# Patient Record
Sex: MALE | Race: BLACK OR AFRICAN AMERICAN | NOT HISPANIC OR LATINO | URBAN - METROPOLITAN AREA
[De-identification: names, ages, dates, MRNs, and addresses within clinical notes are randomized per-mention and may not be internally consistent; named-entity substitution may affect disease eponyms.]

---

## 2023-12-11 ENCOUNTER — HOSPITAL ENCOUNTER (EMERGENCY)
Facility: HOSPITAL | Age: 68
Discharge: HOME | End: 2023-12-12
Attending: EMERGENCY MEDICINE
Payer: MEDICARE

## 2023-12-11 DIAGNOSIS — T50.904A DRUG OVERDOSE OF UNDETERMINED INTENT, INITIAL ENCOUNTER: Primary | ICD-10-CM

## 2023-12-11 PROCEDURE — 99283 EMERGENCY DEPT VISIT LOW MDM: CPT | Mod: 25 | Performed by: EMERGENCY MEDICINE

## 2023-12-11 PROCEDURE — 93010 ELECTROCARDIOGRAM REPORT: CPT | Performed by: EMERGENCY MEDICINE

## 2023-12-11 PROCEDURE — 99284 EMERGENCY DEPT VISIT MOD MDM: CPT | Performed by: EMERGENCY MEDICINE

## 2023-12-11 RX ORDER — TOPIRAMATE 100 MG/1
100 TABLET, FILM COATED ORAL DAILY
Status: DISCONTINUED | OUTPATIENT
Start: 2023-12-12 | End: 2023-12-12 | Stop reason: HOSPADM

## 2023-12-11 ASSESSMENT — COLUMBIA-SUICIDE SEVERITY RATING SCALE - C-SSRS
6. HAVE YOU EVER DONE ANYTHING, STARTED TO DO ANYTHING, OR PREPARED TO DO ANYTHING TO END YOUR LIFE?: NO
2. HAVE YOU ACTUALLY HAD ANY THOUGHTS OF KILLING YOURSELF?: NO
1. IN THE PAST MONTH, HAVE YOU WISHED YOU WERE DEAD OR WISHED YOU COULD GO TO SLEEP AND NOT WAKE UP?: NO

## 2023-12-11 ASSESSMENT — PAIN - FUNCTIONAL ASSESSMENT: PAIN_FUNCTIONAL_ASSESSMENT: 0-10

## 2023-12-11 ASSESSMENT — PAIN SCALES - GENERAL: PAINLEVEL_OUTOF10: 0 - NO PAIN

## 2023-12-12 ENCOUNTER — ANCILLARY PROCEDURE (OUTPATIENT)
Dept: EMERGENCY MEDICINE | Facility: HOSPITAL | Age: 68
End: 2023-12-12
Payer: MEDICARE

## 2023-12-12 VITALS
RESPIRATION RATE: 16 BRPM | HEART RATE: 62 BPM | BODY MASS INDEX: 21.47 KG/M2 | HEIGHT: 70 IN | WEIGHT: 150 LBS | OXYGEN SATURATION: 99 % | SYSTOLIC BLOOD PRESSURE: 122 MMHG | DIASTOLIC BLOOD PRESSURE: 70 MMHG | TEMPERATURE: 97.6 F

## 2023-12-12 VITALS
WEIGHT: 150 LBS | SYSTOLIC BLOOD PRESSURE: 117 MMHG | DIASTOLIC BLOOD PRESSURE: 71 MMHG | TEMPERATURE: 97.6 F | HEART RATE: 54 BPM | OXYGEN SATURATION: 96 % | RESPIRATION RATE: 16 BRPM | BODY MASS INDEX: 21.47 KG/M2 | HEIGHT: 70 IN

## 2023-12-12 DIAGNOSIS — T40.2X1A OPIOID OVERDOSE, ACCIDENTAL OR UNINTENTIONAL, INITIAL ENCOUNTER (MULTI): Primary | ICD-10-CM

## 2023-12-12 LAB
ANION GAP BLDV CALCULATED.4IONS-SCNC: 10 MMOL/L (ref 10–25)
ANION GAP BLDV CALCULATED.4IONS-SCNC: 12 MMOL/L (ref 10–25)
ANION GAP BLDV CALCULATED.4IONS-SCNC: 9 MMOL/L (ref 10–25)
ATRIAL RATE: 90 BPM
BASE EXCESS BLDV CALC-SCNC: -1.2 MMOL/L (ref -2–3)
BASE EXCESS BLDV CALC-SCNC: -3.2 MMOL/L (ref -2–3)
BASE EXCESS BLDV CALC-SCNC: 1.2 MMOL/L (ref -2–3)
BODY TEMPERATURE: 37 DEGREES CELSIUS
CA-I BLDV-SCNC: 1.09 MMOL/L (ref 1.1–1.33)
CA-I BLDV-SCNC: 1.15 MMOL/L (ref 1.1–1.33)
CA-I BLDV-SCNC: 1.18 MMOL/L (ref 1.1–1.33)
CHLORIDE BLDV-SCNC: 105 MMOL/L (ref 98–107)
CHLORIDE BLDV-SCNC: 105 MMOL/L (ref 98–107)
CHLORIDE BLDV-SCNC: 108 MMOL/L (ref 98–107)
GLUCOSE BLD MANUAL STRIP-MCNC: 107 MG/DL (ref 74–99)
GLUCOSE BLD MANUAL STRIP-MCNC: 68 MG/DL (ref 74–99)
GLUCOSE BLD MANUAL STRIP-MCNC: 69 MG/DL (ref 74–99)
GLUCOSE BLD MANUAL STRIP-MCNC: 75 MG/DL (ref 74–99)
GLUCOSE BLDV-MCNC: 110 MG/DL (ref 74–99)
GLUCOSE BLDV-MCNC: 60 MG/DL (ref 74–99)
GLUCOSE BLDV-MCNC: 72 MG/DL (ref 74–99)
HCO3 BLDV-SCNC: 26 MMOL/L (ref 22–26)
HCO3 BLDV-SCNC: 26.4 MMOL/L (ref 22–26)
HCO3 BLDV-SCNC: 29.1 MMOL/L (ref 22–26)
HCT VFR BLD EST: 33 % (ref 41–52)
HCT VFR BLD EST: 34 % (ref 41–52)
HCT VFR BLD EST: 39 % (ref 41–52)
HGB BLDV-MCNC: 10.9 G/DL (ref 13.5–17.5)
HGB BLDV-MCNC: 11.2 G/DL (ref 13.5–17.5)
HGB BLDV-MCNC: 13.1 G/DL (ref 13.5–17.5)
INHALED O2 CONCENTRATION: 21 %
INHALED O2 CONCENTRATION: 21 %
LACTATE BLDV-SCNC: 0.7 MMOL/L (ref 0.4–2)
LACTATE BLDV-SCNC: 1 MMOL/L (ref 0.4–2)
LACTATE BLDV-SCNC: 1.9 MMOL/L (ref 0.4–2)
OXYHGB MFR BLDV: 34.6 % (ref 45–75)
OXYHGB MFR BLDV: 53.8 % (ref 45–75)
OXYHGB MFR BLDV: 81.4 % (ref 45–75)
P AXIS: 82 DEGREES
P OFFSET: 158 MS
P ONSET: 102 MS
PCO2 BLDV: 54 MM HG (ref 41–51)
PCO2 BLDV: 62 MM HG (ref 41–51)
PCO2 BLDV: 69 MM HG (ref 41–51)
PH BLDV: 7.19 PH (ref 7.33–7.43)
PH BLDV: 7.28 PH (ref 7.33–7.43)
PH BLDV: 7.29 PH (ref 7.33–7.43)
PO2 BLDV: 30 MM HG (ref 35–45)
PO2 BLDV: 38 MM HG (ref 35–45)
PO2 BLDV: 61 MM HG (ref 35–45)
POTASSIUM BLDV-SCNC: 4.3 MMOL/L (ref 3.5–5.3)
POTASSIUM BLDV-SCNC: 4.6 MMOL/L (ref 3.5–5.3)
POTASSIUM BLDV-SCNC: 5.1 MMOL/L (ref 3.5–5.3)
PR INTERVAL: 238 MS
Q ONSET: 221 MS
QRS COUNT: 15 BEATS
QRS DURATION: 68 MS
QT INTERVAL: 366 MS
QTC CALCULATION(BAZETT): 447 MS
QTC FREDERICIA: 419 MS
R AXIS: 68 DEGREES
SAO2 % BLDV: 36 % (ref 45–75)
SAO2 % BLDV: 56 % (ref 45–75)
SAO2 % BLDV: 85 % (ref 45–75)
SODIUM BLDV-SCNC: 138 MMOL/L (ref 136–145)
SODIUM BLDV-SCNC: 139 MMOL/L (ref 136–145)
SODIUM BLDV-SCNC: 140 MMOL/L (ref 136–145)
T AXIS: 74 DEGREES
T OFFSET: 404 MS
VENTRICULAR RATE: 90 BPM

## 2023-12-12 PROCEDURE — 2500000004 HC RX 250 GENERAL PHARMACY W/ HCPCS (ALT 636 FOR OP/ED): Mod: SE

## 2023-12-12 PROCEDURE — 96360 HYDRATION IV INFUSION INIT: CPT

## 2023-12-12 PROCEDURE — 94681 O2 UPTK CO2 OUTP % O2 XTRC: CPT

## 2023-12-12 PROCEDURE — 82947 ASSAY GLUCOSE BLOOD QUANT: CPT

## 2023-12-12 PROCEDURE — 93005 ELECTROCARDIOGRAM TRACING: CPT

## 2023-12-12 PROCEDURE — 83605 ASSAY OF LACTIC ACID: CPT | Performed by: STUDENT IN AN ORGANIZED HEALTH CARE EDUCATION/TRAINING PROGRAM

## 2023-12-12 PROCEDURE — 2500000004 HC RX 250 GENERAL PHARMACY W/ HCPCS (ALT 636 FOR OP/ED): Mod: SE | Performed by: EMERGENCY MEDICINE

## 2023-12-12 PROCEDURE — 99291 CRITICAL CARE FIRST HOUR: CPT | Mod: 25 | Performed by: EMERGENCY MEDICINE

## 2023-12-12 PROCEDURE — 36415 COLL VENOUS BLD VENIPUNCTURE: CPT | Performed by: STUDENT IN AN ORGANIZED HEALTH CARE EDUCATION/TRAINING PROGRAM

## 2023-12-12 PROCEDURE — 82330 ASSAY OF CALCIUM: CPT | Performed by: STUDENT IN AN ORGANIZED HEALTH CARE EDUCATION/TRAINING PROGRAM

## 2023-12-12 PROCEDURE — 84132 ASSAY OF SERUM POTASSIUM: CPT

## 2023-12-12 RX ORDER — NALOXONE HYDROCHLORIDE 4 MG/.1ML
4 SPRAY NASAL AS NEEDED
Qty: 1 EACH | Refills: 0 | Status: SHIPPED | OUTPATIENT
Start: 2023-12-12 | End: 2023-12-12 | Stop reason: WASHOUT

## 2023-12-12 RX ORDER — NALOXONE HYDROCHLORIDE 4 MG/.1ML
SPRAY NASAL
Status: DISPENSED
Start: 2023-12-12 | End: 2023-12-12

## 2023-12-12 RX ORDER — NALOXONE HYDROCHLORIDE 4 MG/.1ML
4 SPRAY NASAL AS NEEDED
Qty: 1 EACH | Refills: 0
Start: 2023-12-12 | End: 2023-12-12 | Stop reason: WASHOUT

## 2023-12-12 RX ORDER — NALOXONE HYDROCHLORIDE 4 MG/.1ML
4 SPRAY NASAL AS NEEDED
Qty: 1 EACH | Refills: 0 | Status: ACTIVE
Start: 2023-12-12 | End: 2023-12-13

## 2023-12-12 RX ORDER — NALOXONE HYDROCHLORIDE 1 MG/ML
INJECTION INTRAMUSCULAR; INTRAVENOUS; SUBCUTANEOUS
Status: COMPLETED
Start: 2023-12-12 | End: 2023-12-12

## 2023-12-12 RX ORDER — NALOXONE HYDROCHLORIDE 4 MG/.1ML
4 SPRAY NASAL AS NEEDED
Qty: 1 EACH | Refills: 0 | COMMUNITY
Start: 2023-12-12 | End: 2024-12-11

## 2023-12-12 RX ADMIN — NALOXONE HYDROCHLORIDE 2 MG: 1 INJECTION, SOLUTION INTRAMUSCULAR; INTRAVENOUS; SUBCUTANEOUS at 09:17

## 2023-12-12 RX ADMIN — SODIUM CHLORIDE, POTASSIUM CHLORIDE, SODIUM LACTATE AND CALCIUM CHLORIDE 1000 ML: 600; 310; 30; 20 INJECTION, SOLUTION INTRAVENOUS at 13:41

## 2023-12-12 ASSESSMENT — COLUMBIA-SUICIDE SEVERITY RATING SCALE - C-SSRS
2. HAVE YOU ACTUALLY HAD ANY THOUGHTS OF KILLING YOURSELF?: NO
1. IN THE PAST MONTH, HAVE YOU WISHED YOU WERE DEAD OR WISHED YOU COULD GO TO SLEEP AND NOT WAKE UP?: NO
6. HAVE YOU EVER DONE ANYTHING, STARTED TO DO ANYTHING, OR PREPARED TO DO ANYTHING TO END YOUR LIFE?: NO

## 2023-12-12 ASSESSMENT — LIFESTYLE VARIABLES
EVER FELT BAD OR GUILTY ABOUT YOUR DRINKING: NO
HAVE YOU EVER FELT YOU SHOULD CUT DOWN ON YOUR DRINKING: NO
HAVE PEOPLE ANNOYED YOU BY CRITICIZING YOUR DRINKING: NO
REASON UNABLE TO ASSESS: NO
EVER HAD A DRINK FIRST THING IN THE MORNING TO STEADY YOUR NERVES TO GET RID OF A HANGOVER: NO

## 2023-12-12 ASSESSMENT — PAIN - FUNCTIONAL ASSESSMENT: PAIN_FUNCTIONAL_ASSESSMENT: 0-10

## 2023-12-12 ASSESSMENT — PAIN SCALES - GENERAL: PAINLEVEL_OUTOF10: 0 - NO PAIN

## 2023-12-12 NOTE — DISCHARGE INSTRUCTIONS
Please stop using opioid medications. Please  Narcan and use as needed. Come back if you decide you would like help with substance use disorder.

## 2023-12-12 NOTE — ED PROVIDER NOTES
History of Present Illness   CC: Drug Overdose     History provided by: Patient  Limitations to History: None    HPI:  Rodo Guzman is a 68 y.o. male with history of epilepsy, not compliant with topiramate, opioid use disorder presenting to the emergency department after overdose.  Patient reports snorting heroin this evening, snorts it every day or every other day.  States there also been something laced in it because he is not sure what happened after he snorted it.  EMS reportedly provided at 8 mg of Narcan due to patient unresponsiveness, patient awake, alert at time of my assessment.  No complaints at this time.  Denies any chest pain, shortness of breath, abdominal pain, nausea, vomiting.  Does want to seek treatment for opioid use disorder and stop using.  Denies any additional substance use.  Denies any history of opioid withdrawal.    External Records Reviewed: Patient has previous presentations for similar complaints to [facility] on 9/27/2023    Physical Exam   Triage vitals:  T 36.4 °C (97.6 °F)  HR 81  BP (!) 145/98  RR 18  O2 97 % None (Room air)    Vital signs reviewed in nursing triage note, EMR flow sheets, and at patient's bedside.   General: Awake, alert, in no acute distress  Eyes: Gaze conjugate.  No scleral icterus or injection  HENT: Normo-cephalic, atraumatic. No stridor.  CV: Regular rate, Regular rhythm. Radial pulses 2+ bilaterally  Resp: Breathing non-labored, speaking in full sentences.  Clear to auscultation bilaterally.  No wheezes, rales, rhonchi  GI: Soft, non-distended, non-tender. No rebound or guarding.  MSK/Extremities: No gross bony deformities. Moving all extremities  Skin: Warm. Appropriate color  Neuro: Alert. Oriented. Face symmetric. Speech is fluent.  Gross strength and sensation intact in b/l UE and LEs  Psych: Appropriate mood and affect    ED Course & Medical Decision Making   ED Course:  ED Course as of 12/12/23 1201   Mon Dec 11, 2023   3779 ECG 12 lead  EKG  obtained at 2241 demonstrating sinus rhythm with rate of 90, first-degree AV block, normal axis, otherwise normal intervals, no ST segment or T wave signs of ischemia. [SH]      ED Course User Index  [SH] Duncan Bhatt MD         Diagnoses as of 12/12/23 1201   Drug overdose of undetermined intent, initial encounter       Differential diagnoses considered include but are not limited to: Opioid overdose, intoxication by other substance, opioid withdrawal, seizure, syncope    Social Determinants Limiting Care: Substance Use    MDM:  68 y.o. male presenting to the emergency department after apparent opioid overdose, treated with Narcan by EMS.  On arrival is awake, talking, demonstrate no signs of opioid intoxication.  Will observe for a few hours to ensure no rebound toxicity after Narcan treatment.  Exam is benign otherwise.  Patient is requesting to talk with [facility] for her substance use disorder.  Referral placed to [facility].  Patient signed out pending [facility] recommendations.  Provide dose of topiramate here send patient is not taking at home at the moment.    Disposition   Patient was signed out at 2300 pending completion of their work-up.  Please see the next provider's transition of care note for the remainder of the patient's care.    Procedures   Procedures    Patient seen and discussed with ED attending physician.    Kevin Bhatt MD  PGY 3 Emergency Medicine         Duncan Bhatt MD  Resident  12/12/23 1205

## 2023-12-12 NOTE — ED PROVIDER NOTES
CC: Altered Mental Status     HPI:  Patient is a 68-year-old male with history of epilepsy and opioid use disorder presenting to the ED due to concern for overdose.  Patient states he was just discharged here and EMS states that patient was found unresponsive with large bag of illicit substance found next to him.  States that he had pinpoint pupils but was breathing spontaneously therefore did not give Narcan.  No trauma at the scene and patient denies any pain or trauma elsewhere.      Limitations to History: None    Additional History Obtained from: EMS    Records Reviewed:  Recent available ED and inpatient notes reviewed in EMR.    PMHx/PSHx:  Per HPI.   - has no past medical history on file.  - has no past surgical history on file.    Medications:  Reviewed in EMR. See EMR for complete list of medications and doses.    Allergies:  Patient has no known allergies.    Social History:  - Tobacco:  reports that he has been smoking cigarettes. He has never used smokeless tobacco.   - Alcohol:  has no history on file for alcohol use.   - Illicit Drugs:  has no history on file for drug use.     ROS:  Per HPI.     ???????????????????????????????????????????????????????????????  Triage Vitals:  T 36.4 °C (97.6 °F)    BP (!) 146/96  RR 14  O2 95 % Supplemental oxygen    PHYSICAL EXAM:   VS: As documented in the triage note and EMR flowsheet from this visit were reviewed.  Gen: NAD  Eyes: PERRL, EOMI. Clear scerla.  HENT: NC/AT, Mucosal membranes dry.   Neck: Supple, no cervical LAD  Resp: sonorous breathing on 4L NC, CTAB  CV: tachycardic, RR, nl S1, S2, no murmurs  Abd: Soft, non-distended, non-tender  Ext: no LE edema, pulses full and equal  Skin: WWP. No systemic rashes or lesions.  MSK: normal muscle bulk, no obvious joint swelling in extremities  Neuro: Sonorous but awakes with sternal rub follows basic commands, no facial droop, moves all extremities to command with 5 out of 5 strength, sensation intact x  4 extremities,  Psych:  Appropriate mood and affect  ???????????????????????????????????????????????????????????????  ED Labs/Imaging:   Labs Reviewed   POCT GLUCOSE - Abnormal       Result Value    POCT Glucose 107 (*)    BLOOD GAS VENOUS FULL PANEL UNSOLICITED - Abnormal    POCT pH, Venous 7.19 (*)     POCT pCO2, Venous 69 (*)     POCT pO2, Venous 61 (*)     POCT SO2, Venous 85 (*)     POCT Oxy Hemoglobin, Venous 81.4 (*)     POCT Hematocrit Calculated, Venous 39.0 (*)     POCT Sodium, Venous 139      POCT Potassium, Venous 4.6      POCT Chloride, Venous 105      POCT Ionized Calicum, Venous 1.18      POCT Glucose, Venous 110 (*)     POCT Lactate, Venous 1.9      POCT Base Excess, Venous -3.2 (*)     POCT HCO3 Calculated, Venous 26.4 (*)     POCT Hemoglobin, Venous 13.1 (*)     POCT Anion Gap, Venous 12.0      Patient Temperature 37.0       No orders to display         ED Course & MDM   Diagnoses as of 12/12/23 1743   Opioid overdose, accidental or unintentional, initial encounter (CMS/[facility])           Medical Decision Making  Patient was called as a critical due to concern for opioid overdose in the field. Patient arrives sonorous and placed on end-tidal CO2.  Patient had desaturations and episodes of apnea in the setting of pinpoint pupils and known drug use concerning for opioid overdose. VBG with respiratory acidosis. Patient was given 2 of IV Narcan and had immediate resolution of his symptoms.  Will be kept on end-tidal and observed. Found to be hypoglycemic on repeat VBG which corrected with PO intake. Patient observed for multiple hours and did not require additional narcan. Gabrielive spoke to patient regarding substance use, patient declined help at this time. Counseled extensively on the risks of using illicit substances including respiratory arrest and death. Patient reminded of narcan given to him overnight when he was here for same complaint and reminded how it is administered. All questions answered and  pt discharged in stable condition.      Social Determinants Limiting Care:  Poor health literacy    Disposition:  Discharge home.     Patient seen and discussed with attending physician.    Batsheva Armas MD PGY3  Emergency Medicine      Procedures ? [employer] last updated 12/12/2023 10:09 AM        Batsheva Armas MD  Resident  12/12/23 6708

## 2023-12-12 NOTE — ED TRIAGE NOTES
PT presenting with AMS discharged this AM after OD, [place] [employer]. Minimally responsive in and out of it.  Pin point pupils  
Home

## 2023-12-12 NOTE — ED TRIAGE NOTES
Pt presents to the ED for the complaint of overdose. Pt states he snorted a bag of heroin around 1800 this evening. Pt states he was going [place] a bus station and must have passed out. EMS states 8mg of narcan was given upon arrival to scene. After administration, pt alert and responsive to commands. Pt denies medical complaints upon arrival to ED. Pt AxOx3, neurologically intact.   
Alert/Cooperative/Awake

## 2023-12-12 NOTE — PROGRESS NOTES
I assumed care of this patient at 2300 hrs.    Please see initial provider note for full HPI.  Briefly this is a 68-year-old who came in after heroin overdose, received Narcan and has been hemodynamically stable.  He wished for [facility] intervention and [facility] evaluated the patient.  He declined inpatient services but did sign up for peers support group and was given resources.  He was given strict return precautions and was discharged in hemodynamically stable condition with a Narcan kit.  Patient care was overseen by attending physician agrees with the plan disposition.    Juliet Thacker MD  Emergency Medicine - PGY2  [facility]  [address] [facility] [address]

## 2023-12-15 NOTE — ED PROCEDURE NOTE
Procedure  Critical Care    Performed by: Issa Mccord DO  Authorized by: Steve Diez MD MPH    Critical care provider statement:     Critical care time (minutes):  35    Critical care time was exclusive of:  Teaching time and separately billable procedures and treating other patients    Critical care was necessary to treat or prevent imminent or life-threatening deterioration of the following conditions:  CNS failure or compromise and respiratory failure    Critical care was time spent personally by me on the following activities:  Blood draw for specimens, evaluation of patient's response to treatment, examination of patient, ordering and performing treatments and interventions, ordering and review of laboratory studies, pulse oximetry and re-evaluation of patient's condition    I assumed direction of critical care for this patient from another provider in my specialty: no                 Issa Mccord DO  12/15/23 5900

## 2024-07-03 ENCOUNTER — HOSPITAL ENCOUNTER (EMERGENCY)
Facility: HOSPITAL | Age: 69
Discharge: HOME | End: 2024-07-04
Attending: EMERGENCY MEDICINE
Payer: MEDICAID

## 2024-07-03 ENCOUNTER — APPOINTMENT (OUTPATIENT)
Dept: RADIOLOGY | Facility: HOSPITAL | Age: 69
End: 2024-07-03
Payer: MEDICAID

## 2024-07-03 ENCOUNTER — CLINICAL SUPPORT (OUTPATIENT)
Dept: EMERGENCY MEDICINE | Facility: HOSPITAL | Age: 69
End: 2024-07-03
Payer: MEDICAID

## 2024-07-03 DIAGNOSIS — F11.90 OPIOID USE DISORDER: ICD-10-CM

## 2024-07-03 DIAGNOSIS — R42 LIGHTHEADEDNESS: Primary | ICD-10-CM

## 2024-07-03 LAB
ANION GAP BLDV CALCULATED.4IONS-SCNC: 10 MMOL/L (ref 10–25)
ANION GAP SERPL CALC-SCNC: 12 MMOL/L (ref 10–20)
APAP SERPL-MCNC: <10 UG/ML
APPEARANCE UR: CLEAR
BASE EXCESS BLDV CALC-SCNC: 1.6 MMOL/L (ref -2–3)
BASOPHILS # BLD AUTO: 0.02 X10*3/UL (ref 0–0.1)
BASOPHILS NFR BLD AUTO: 0.2 %
BILIRUB UR STRIP.AUTO-MCNC: NEGATIVE MG/DL
BODY TEMPERATURE: 37 DEGREES CELSIUS
BUN SERPL-MCNC: 19 MG/DL (ref 6–23)
CA-I BLDV-SCNC: 1.18 MMOL/L (ref 1.1–1.33)
CALCIUM SERPL-MCNC: 8.4 MG/DL (ref 8.6–10.6)
CARDIAC TROPONIN I PNL SERPL HS: 16 NG/L (ref 0–53)
CHLORIDE BLDV-SCNC: 104 MMOL/L (ref 98–107)
CHLORIDE SERPL-SCNC: 105 MMOL/L (ref 98–107)
CO2 SERPL-SCNC: 27 MMOL/L (ref 21–32)
COLOR UR: ABNORMAL
CREAT SERPL-MCNC: 1.49 MG/DL (ref 0.5–1.3)
EGFRCR SERPLBLD CKD-EPI 2021: 51 ML/MIN/1.73M*2
EOSINOPHIL # BLD AUTO: 0.02 X10*3/UL (ref 0–0.7)
EOSINOPHIL NFR BLD AUTO: 0.2 %
ERYTHROCYTE [DISTWIDTH] IN BLOOD BY AUTOMATED COUNT: 14.7 % (ref 11.5–14.5)
ETHANOL SERPL-MCNC: <10 MG/DL
FLUAV RNA RESP QL NAA+PROBE: NOT DETECTED
FLUBV RNA RESP QL NAA+PROBE: NOT DETECTED
GLUCOSE BLDV-MCNC: 116 MG/DL (ref 74–99)
GLUCOSE SERPL-MCNC: 118 MG/DL (ref 74–99)
GLUCOSE UR STRIP.AUTO-MCNC: ABNORMAL MG/DL
HCO3 BLDV-SCNC: 29 MMOL/L (ref 22–26)
HCT VFR BLD AUTO: 31.4 % (ref 41–52)
HCT VFR BLD EST: 33 % (ref 41–52)
HGB BLD-MCNC: 10.6 G/DL (ref 13.5–17.5)
HGB BLDV-MCNC: 11.1 G/DL (ref 13.5–17.5)
HOLD SPECIMEN: NORMAL
IMM GRANULOCYTES # BLD AUTO: 0.04 X10*3/UL (ref 0–0.7)
IMM GRANULOCYTES NFR BLD AUTO: 0.3 % (ref 0–0.9)
INHALED O2 CONCENTRATION: 21 %
KETONES UR STRIP.AUTO-MCNC: NEGATIVE MG/DL
LACTATE BLDV-SCNC: 1.2 MMOL/L (ref 0.4–2)
LEUKOCYTE ESTERASE UR QL STRIP.AUTO: NEGATIVE
LYMPHOCYTES # BLD AUTO: 1.43 X10*3/UL (ref 1.2–4.8)
LYMPHOCYTES NFR BLD AUTO: 12.1 %
MCH RBC QN AUTO: 28 PG (ref 26–34)
MCHC RBC AUTO-ENTMCNC: 33.8 G/DL (ref 32–36)
MCV RBC AUTO: 83 FL (ref 80–100)
MONOCYTES # BLD AUTO: 0.82 X10*3/UL (ref 0.1–1)
MONOCYTES NFR BLD AUTO: 6.9 %
NEUTROPHILS # BLD AUTO: 9.47 X10*3/UL (ref 1.2–7.7)
NEUTROPHILS NFR BLD AUTO: 80.3 %
NITRITE UR QL STRIP.AUTO: NEGATIVE
NRBC BLD-RTO: 0 /100 WBCS (ref 0–0)
OXYHGB MFR BLDV: 24.3 % (ref 45–75)
PCO2 BLDV: 59 MM HG (ref 41–51)
PH BLDV: 7.3 PH (ref 7.33–7.43)
PH UR STRIP.AUTO: 6 [PH]
PLATELET # BLD AUTO: 220 X10*3/UL (ref 150–450)
PO2 BLDV: 20 MM HG (ref 35–45)
POTASSIUM BLDV-SCNC: 3.8 MMOL/L (ref 3.5–5.3)
POTASSIUM SERPL-SCNC: 3.8 MMOL/L (ref 3.5–5.3)
PROT UR STRIP.AUTO-MCNC: NEGATIVE MG/DL
RBC # BLD AUTO: 3.78 X10*6/UL (ref 4.5–5.9)
RBC # UR STRIP.AUTO: NEGATIVE /UL
SALICYLATES SERPL-MCNC: <3 MG/DL
SAO2 % BLDV: 25 % (ref 45–75)
SARS-COV-2 RNA RESP QL NAA+PROBE: NOT DETECTED
SODIUM BLDV-SCNC: 139 MMOL/L (ref 136–145)
SODIUM SERPL-SCNC: 140 MMOL/L (ref 136–145)
SP GR UR STRIP.AUTO: 1.01
UROBILINOGEN UR STRIP.AUTO-MCNC: NORMAL MG/DL
WBC # BLD AUTO: 11.8 X10*3/UL (ref 4.4–11.3)

## 2024-07-03 PROCEDURE — 99283 EMERGENCY DEPT VISIT LOW MDM: CPT | Mod: 25

## 2024-07-03 PROCEDURE — 87636 SARSCOV2 & INF A&B AMP PRB: CPT | Performed by: EMERGENCY MEDICINE

## 2024-07-03 PROCEDURE — 2500000004 HC RX 250 GENERAL PHARMACY W/ HCPCS (ALT 636 FOR OP/ED): Mod: SE | Performed by: EMERGENCY MEDICINE

## 2024-07-03 PROCEDURE — 81003 URINALYSIS AUTO W/O SCOPE: CPT | Performed by: PHYSICIAN ASSISTANT

## 2024-07-03 PROCEDURE — 93005 ELECTROCARDIOGRAM TRACING: CPT

## 2024-07-03 PROCEDURE — 36415 COLL VENOUS BLD VENIPUNCTURE: CPT | Performed by: PHYSICIAN ASSISTANT

## 2024-07-03 PROCEDURE — 96360 HYDRATION IV INFUSION INIT: CPT

## 2024-07-03 PROCEDURE — 85025 COMPLETE CBC W/AUTO DIFF WBC: CPT | Performed by: PHYSICIAN ASSISTANT

## 2024-07-03 PROCEDURE — 99284 EMERGENCY DEPT VISIT MOD MDM: CPT | Performed by: PHYSICIAN ASSISTANT

## 2024-07-03 PROCEDURE — 84484 ASSAY OF TROPONIN QUANT: CPT | Performed by: PHYSICIAN ASSISTANT

## 2024-07-03 PROCEDURE — 71046 X-RAY EXAM CHEST 2 VIEWS: CPT | Performed by: SURGERY

## 2024-07-03 PROCEDURE — 71046 X-RAY EXAM CHEST 2 VIEWS: CPT

## 2024-07-03 PROCEDURE — 80143 DRUG ASSAY ACETAMINOPHEN: CPT | Performed by: PHYSICIAN ASSISTANT

## 2024-07-03 PROCEDURE — 84132 ASSAY OF SERUM POTASSIUM: CPT | Performed by: PHYSICIAN ASSISTANT

## 2024-07-03 PROCEDURE — 82374 ASSAY BLOOD CARBON DIOXIDE: CPT | Performed by: PHYSICIAN ASSISTANT

## 2024-07-03 ASSESSMENT — PAIN DESCRIPTION - PROGRESSION: CLINICAL_PROGRESSION: NOT CHANGED

## 2024-07-03 ASSESSMENT — PAIN SCALES - GENERAL: PAINLEVEL_OUTOF10: 0 - NO PAIN

## 2024-07-03 ASSESSMENT — LIFESTYLE VARIABLES
TOTAL SCORE: 0
EVER FELT BAD OR GUILTY ABOUT YOUR DRINKING: NO
HAVE PEOPLE ANNOYED YOU BY CRITICIZING YOUR DRINKING: NO
HAVE YOU EVER FELT YOU SHOULD CUT DOWN ON YOUR DRINKING: NO
EVER HAD A DRINK FIRST THING IN THE MORNING TO STEADY YOUR NERVES TO GET RID OF A HANGOVER: NO

## 2024-07-03 ASSESSMENT — PAIN - FUNCTIONAL ASSESSMENT
PAIN_FUNCTIONAL_ASSESSMENT: 0-10
PAIN_FUNCTIONAL_ASSESSMENT: 0-10

## 2024-07-04 VITALS
DIASTOLIC BLOOD PRESSURE: 71 MMHG | OXYGEN SATURATION: 95 % | HEART RATE: 53 BPM | SYSTOLIC BLOOD PRESSURE: 122 MMHG | HEIGHT: 70 IN | WEIGHT: 125 LBS | BODY MASS INDEX: 17.9 KG/M2 | RESPIRATION RATE: 16 BRPM | TEMPERATURE: 97.9 F

## 2024-07-04 LAB — HOLD SPECIMEN: NORMAL

## 2024-07-04 RX ORDER — NALOXONE HYDROCHLORIDE 4 MG/.1ML
4 SPRAY NASAL AS NEEDED
Status: ACTIVE
Start: 2024-07-04 | End: 2024-07-05

## 2024-07-04 NOTE — PROGRESS NOTES
I received Rodo Guzman in signout from outgoing provider.  Please see the previous note for all HPI, PE and MDM up to the time of signout at 2300.    In brief Rodo Guzman is an 68 y.o. male presenting for lightheadedness and abnormal behavior.  Chief Complaint   Patient presents with    Altered Mental Status    Dizziness   .  At the time of signout we were awaiting: Multiple laboratory studies and reassessment.    I examined the patient at time of assumption of care:  Awake alert and oriented x 4, no chest pain or shortness of breath, heart rate in the 70s, no hypoxia, no hypotension, warm and well-perfused mentating normally.  Moving all 4 extremities without difficulty or deficit.        During my care patient was reassessed after lab work showed no acute intervenable pathology.  Patient did endorse opioid use.  Patient states that he does not want to talk to [facility] at this time.  He states that he feels ready for discharge and is not having any symptoms at this time.  I did  him to return for chest pain, shortness of breath, difficulty breathing, lightheadedness, syncope or passing out or having a seizure.  He was discharged with take-home naloxone.      Patient seen and discussed with attending of record.    Bryson Guerra MD

## 2024-07-04 NOTE — ED PROVIDER NOTES
Procedural Sedation (Adult)  You have been given medicine by vein to make you sleep during your surgery. This may have included both a pain medicine and sleeping medicine. Most of the effects have worn off. But you may still have some drowsiness for the next 6 to 8 hours.  Home care  Follow these guidelines when you get home:  · For the next 8 hours, you should be watched by a responsible adult. This person should make sure your condition is not getting worse.  · Don't take any medicine by mouth for pain or for sleep during the next 4 hours. These might react with the medicines you were given in the hospital. This could cause a much stronger response than usual.  · Don't drink any alcohol for the next 24 hours.  · Don't drive, operate dangerous machinery, or make important business or personal decisions during the next 24 hours.  Follow-up care  Follow up with your healthcare provider if you are not alert and back to your usual level of activity within 12 hours.  When to seek medical advice  Call your healthcare provider right away if any of these occur:  · Drowsiness gets worse  · Weakness or dizziness gets worse  · Repeated vomiting  · You cannot be awakened   Date Last Reviewed: 10/18/2016  © 7924-0660 [employer]. [address] [place] [address], [place] [address]. All rights reserved. This information is not intended as a substitute for professional medical care. Always follow your healthcare professional's instructions.         "HPI   Chief Complaint   Patient presents with    Altered Mental Status    Dizziness       HPI: Patient is a 68-year-old male with history of epilepsy and opioid use disorder who presents to the ED for altered mental status.  Per EMS, patient was on the bus when he was not answering question and \"acting strange\" so the  called EMS.  Per EMS patient occasionally would stare off into space and not answer their questions that remained stable and round.  Patient is alert and oriented x 4.  States that he has felt short of breath and lightheaded throughout the day.  He denies any chest pain, normal nausea, vomiting, abdominal pain.  States that he took his seizure medication today.    ------------------------------------------------------------------------------------------------------------------------------------------  ROS: a ten point review of systems was performed and was negative except as per HPI.  ------------------------------------------------------------------------------------------------------------------------------------------  PMH / PSH: as per HPI, otherwise reviewed   MEDS: as per HPI, otherwise reviewed in EMR  ALLERGIES: as per HPI, otherwise reviewed in EMR  SocH:  as per HPI, otherwise reviewed in EMR  FH:  as per HPI, otherwise reviewed in EMR   ------------------------------------------------------------------------------------------------------------------------------------------  Physical Exam:  VS: As documented in the triage note and EMR flowsheet from this visit was reviewed  General: Well appearing. No acute distress.   Eyes:  Extraocular movements grossly intact. No scleral icterus.  Pinpoint pupils.  Head: Atraumatic. Normocephalic.     Neck: No meningismus. No gross masses. Full movement through range of motion  ENT: Posterior oropharynx shows no erythema, exudate or edema.  Uvula is midline without edema.  No stridor or trismus  CV: Regular rhythm. No murmurs, rubs, gallops " appreciated.   Resp: Clear to auscultation bilaterally. No respiratory distress.    GI: Nontender. Soft. No masses. No rebound, rigidity or guarding.   MSK: Symmetric muscle bulk. No gross step offs or deformities.  Skin: Warm, dry. No rashes  Neuro: CN II-VII intact. A&O x4. Speech fluent. Alert. Moving all extremities. Ambulates with normal gait. 5 out of 5 strength in the bilateral upper and lower extremities.  Obeys commands.  No nystagmus.  Normal finger-to-nose.  Psych: Appropriate mood and affect for situation.  ------------------------------------------------------------------------------------------------------------------------------------------  Hospital Course / Medical Decision Making: Patient seen and discussed with Dr. Goodwin.  Patient is a 68-year-old male who presents to the ED for altered mental status.  On examination, patient is overall well-appearing.  He is alert and oriented x 4.  Obeys commands.  His neurological examination is normal although he does fall asleep throughout the examination.  He was noted to have pinpoint pupils on examination.  Hypotensive at 92/70, otherwise vitals are stable.  He was placed on end-tidal.  Administered fluids.  EKG showed sinus rhythm with first-degree AV block, 64 bpm, normal axis, no ST-T wave changes.  Chest x-ray showed hyperinflated lungs suggesting emphysema/COPD which is similar to prior.  Venous blood gas did show mild acidosis at 7.3 with elevated CO2 of 59.  CBC showed a mild leukocytosis at 11.8.  BMP showed mild bump with a creatinine of 1.49, otherwise unremarkable.  Acetaminophen, salicylate and alcohol levels are negative.  Urinalysis without evidence of infection.  Troponin within normal range.  At the end of my shift, pending patient's drug toxicology and COVID swabs.  Suspect that patient's clinical presentation was likely due to opioid use.  Patient will be signed out to the oncoming provider for final results and disposition of the  patient.  Patient be signed out to the oncoming provider.  Please see note for final results and disposition of the patient.                        Kenn Coma Scale Score: 15                     Patient History   No past medical history on file.  No past surgical history on file.  No family history on file.  Social History     Tobacco Use    Smoking status: Every Day     Types: Cigarettes    Smokeless tobacco: Never   Substance Use Topics    Alcohol use: Not on file    Drug use: Not on file       Physical Exam   ED Triage Vitals [07/03/24 2021]   Temperature Heart Rate Respirations BP   36.6 °C (97.9 °F) 80 16 92/74      Pulse Ox Temp Source Heart Rate Source Patient Position   98 % Oral Monitor Sitting      BP Location FiO2 (%)     Right arm --       Physical Exam    ED Course & MDM   ED Course as of 07/03/24 2230 Wed Jul 03, 2024 2152 XR chest 2 views [MG]      ED Course User Index  [MG] Kimberly Duran PA-C       Medical Decision Making      Procedure  Procedures     Kimberly Duran PA-C  07/03/24 2232

## 2024-07-04 NOTE — DISCHARGE INSTRUCTIONS
You are seen today in the emergency department due to concerns about alteration in mental status.  You are at your neurologic baseline now.  Please follow-up with your primary care physician and neurologist as needed.  If you have chest pain, shortness of breath, lightheadedness or seizure-like activity please return to the emergency department.

## 2024-07-04 NOTE — ED TRIAGE NOTES
BIB EMS, Was on bus not acting right.  called 911. Pt was not answering questions and staring. History off Epilepsy.

## 2024-07-08 LAB
ATRIAL RATE: 64 BPM
P AXIS: 68 DEGREES
P OFFSET: 149 MS
P ONSET: 100 MS
PR INTERVAL: 240 MS
Q ONSET: 220 MS
QRS COUNT: 11 BEATS
QRS DURATION: 92 MS
QT INTERVAL: 450 MS
QTC CALCULATION(BAZETT): 464 MS
QTC FREDERICIA: 459 MS
R AXIS: 59 DEGREES
T AXIS: 55 DEGREES
T OFFSET: 445 MS
VENTRICULAR RATE: 64 BPM

## 2024-07-08 PROCEDURE — 93010 ELECTROCARDIOGRAM REPORT: CPT
